# Patient Record
Sex: FEMALE | Race: WHITE | NOT HISPANIC OR LATINO | URBAN - METROPOLITAN AREA
[De-identification: names, ages, dates, MRNs, and addresses within clinical notes are randomized per-mention and may not be internally consistent; named-entity substitution may affect disease eponyms.]

---

## 2017-04-12 ENCOUNTER — OFFICE (OUTPATIENT)
Dept: URBAN - METROPOLITAN AREA CLINIC 14 | Facility: CLINIC | Age: 62
End: 2017-04-12

## 2017-04-12 VITALS
DIASTOLIC BLOOD PRESSURE: 79 MMHG | HEIGHT: 68 IN | HEART RATE: 80 BPM | SYSTOLIC BLOOD PRESSURE: 127 MMHG | WEIGHT: 177 LBS

## 2017-04-12 DIAGNOSIS — R13.10 DYSPHAGIA, UNSPECIFIED: ICD-10-CM

## 2017-04-12 DIAGNOSIS — K21.9 GASTRO-ESOPHAGEAL REFLUX DISEASE WITHOUT ESOPHAGITIS: ICD-10-CM

## 2017-04-12 DIAGNOSIS — E66.3 OVERWEIGHT: ICD-10-CM

## 2017-04-12 PROCEDURE — 99213 OFFICE O/P EST LOW 20 MIN: CPT

## 2017-04-12 RX ORDER — RABEPRAZOLE SODIUM 20 MG/1
20 TABLET, DELAYED RELEASE ORAL
Qty: 90 | Refills: 0 | Status: ACTIVE

## 2017-04-12 NOTE — SERVICEHPINOTES
Mrs. Yanci Dockery is a very pleasant 62 y/o  female who has a history of GERD with voice hoarseness, IBS, and diverticulosis with a previous episode of diverticulitis several years ago. She was previously seen by Dr. Sharp and then saw Dr. Souza prior to seeing Dr. Brody most recently on 2/13/12 at which time celiac serologies returned as negative. (Previous testing elsewhere for lupus and multiple sclerosis was negative ~4 years ago.) With respect to her reflux, she has a 25-year history of GERD symptoms for which she intermittently took TUMS, Pepcid, Zantac, Prilosec, and Nexium over the years. Prior to her office visit with me on 7/30/15, she was taking Prevacid which led to significant but incomplete relief as such I provided her with a trial of pantoprazole. She also noted that a history of solid-food dysphagia, including with breads &amp meats, that was usually aided by drinking water. Of note, an esophagram with a barium tablet in 2010 was entirely normal. As a reminder, a 2/28/02 colonoscopy [address] Dr[address] Sharp led to removal of an inflammatory polyp and identification of pan-diverticular disease.She presented to see me on 8/26/15 for planned EGD with dilatation as well as an updated screening colonoscopy. Her EGD revealed normal-appearing esophageal mucosa and mild gastritis biopsies revealed histologically normal esophageal mucosa and negligible inflammatory cells in the stomach (no H. pylori was identified). I performed esophageal dilatation to 18 mm. Her colonoscopy revealed pan-diverticular disease and internal hemorrhoids. I advised her to continue her Protonix and to undergo repeat colonoscopy 10 years thereafter.Mrs. Dockery returned on 11/4/15 for scheduled follow-up. She was seen at Dr. Sandoval's office on 10/1/15. A nasolangoscopy was performed. She was advised to increase her Protonix from QD to BID therapy which she did. Fortunately, she had already experienced decreased pyrosis and decreased voice hoarseness. As a reminder, Mrs. Dockery indicates that her esophageal dilatation on 8/26/15 led to significant improvement with her dysphagia. She noted that she was able to eat meats and breads without difficulty.Mrs. Dockery returns today for follow-up. Since her last visit, her PPI therapy was converted from Protonix to AcipHex with excellent results. She denies any new complaints to me at this time.

## 2017-04-12 NOTE — SERVICENOTES
We discussed potential risks of long-term PPI use, though Mrs. Dockery opts to continue AcipHex at this time.